# Patient Record
Sex: MALE | Race: WHITE | Employment: UNEMPLOYED | URBAN - METROPOLITAN AREA
[De-identification: names, ages, dates, MRNs, and addresses within clinical notes are randomized per-mention and may not be internally consistent; named-entity substitution may affect disease eponyms.]

---

## 2017-02-28 DIAGNOSIS — E11.9 CONTROLLED TYPE 2 DIABETES MELLITUS WITHOUT COMPLICATION, WITHOUT LONG-TERM CURRENT USE OF INSULIN (HCC): ICD-10-CM

## 2017-02-28 RX ORDER — GLIMEPIRIDE 2 MG/1
TABLET ORAL
Qty: 135 TAB | Refills: 0 | Status: SHIPPED | OUTPATIENT
Start: 2017-02-28 | End: 2017-05-31 | Stop reason: SDUPTHER

## 2017-03-22 ENCOUNTER — OFFICE VISIT (OUTPATIENT)
Dept: INTERNAL MEDICINE CLINIC | Age: 52
End: 2017-03-22

## 2017-03-22 VITALS
DIASTOLIC BLOOD PRESSURE: 80 MMHG | HEART RATE: 56 BPM | BODY MASS INDEX: 29.86 KG/M2 | HEIGHT: 68 IN | SYSTOLIC BLOOD PRESSURE: 125 MMHG | TEMPERATURE: 98.4 F | RESPIRATION RATE: 12 BRPM | WEIGHT: 197 LBS

## 2017-03-22 DIAGNOSIS — I25.10 ATHEROSCLEROSIS OF NATIVE CORONARY ARTERY OF NATIVE HEART, ANGINA PRESENCE UNSPECIFIED: ICD-10-CM

## 2017-03-22 DIAGNOSIS — E11.9 CONTROLLED TYPE 2 DIABETES MELLITUS WITHOUT COMPLICATION, WITHOUT LONG-TERM CURRENT USE OF INSULIN (HCC): Primary | ICD-10-CM

## 2017-03-22 DIAGNOSIS — I10 ESSENTIAL HYPERTENSION: ICD-10-CM

## 2017-03-22 DIAGNOSIS — E78.2 MIXED HYPERLIPIDEMIA: ICD-10-CM

## 2017-03-22 LAB — HBA1C MFR BLD HPLC: 6.3 %

## 2017-03-22 RX ORDER — SIMVASTATIN 40 MG/1
40 TABLET, FILM COATED ORAL
Qty: 90 TAB | Refills: 3 | Status: SHIPPED | OUTPATIENT
Start: 2017-03-22 | End: 2018-06-26 | Stop reason: SDUPTHER

## 2017-03-22 RX ORDER — CARVEDILOL 6.25 MG/1
TABLET ORAL
Qty: 90 TAB | Refills: 3 | Status: SHIPPED | OUTPATIENT
Start: 2017-03-22 | End: 2018-06-26 | Stop reason: SDUPTHER

## 2017-03-22 RX ORDER — METFORMIN HYDROCHLORIDE 500 MG/1
1000 TABLET, EXTENDED RELEASE ORAL 2 TIMES DAILY WITH MEALS
Qty: 180 TAB | Refills: 1 | Status: SHIPPED | OUTPATIENT
Start: 2017-03-22 | End: 2017-09-25 | Stop reason: SDUPTHER

## 2017-03-22 RX ORDER — LISINOPRIL 20 MG/1
20 TABLET ORAL
Qty: 90 TAB | Refills: 3 | Status: SHIPPED | OUTPATIENT
Start: 2017-03-22 | End: 2018-06-26 | Stop reason: SDUPTHER

## 2017-03-22 NOTE — MR AVS SNAPSHOT
Visit Information Date & Time Provider Department Dept. Phone Encounter #  
 3/22/2017  8:15 AM Roe Tapia MD [facility] [address]  [id number] Follow-up Instructions Return in about 3 months (around 6/22/2017) for a1c and labs. Your Appointments 6/13/2017  8:20 AM  
ESTABLISHED PATIENT with Nedra Gil MD  
[facility] (POWER SCHEDULING) Appt Note: 6 mo fu appt [address] [facility] 600 1007 [facility]  
[address] [facility] [address] [address] Upcoming Health Maintenance Date Due  
 EYE EXAM RETINAL OR DILATED Q1 12/14/1977 INFLUENZA AGE 9 TO ADULT 10/1/2017* HEMOGLOBIN A1C Q6M 6/9/2017 FOOT EXAM Q1 8/30/2017 MICROALBUMIN Q1 8/30/2017 LIPID PANEL Q1 12/9/2017 DTaP/Tdap/Td series (2 - Td) 7/27/2022 *Topic was postponed. The date shown is not the original due date. Allergies as of 3/22/2017  Review Complete On: 3/22/2017 By: Roe Tapia MD  
  
 Severity Noted Reaction Type Reactions Erythromycin  09/26/2010    Anaphylaxis Current Immunizations  Reviewed on 7/27/2012 Name Date Pneumococcal Vaccine (Unspecified Type) 7/27/2012 TDAP Vaccine 7/27/2012 Not reviewed this visit You Were Diagnosed With   
  
 Codes Comments Controlled type 2 diabetes mellitus without complication, without long-term current use of insulin ([facility] [address]    -  Primary ICD-10-CM: E11.9 ICD-9-CM: 250.00 Atherosclerosis of native coronary artery of native heart, angina presence unspecified     ICD-10-CM: I25.10 ICD-9-CM: 414.01 Essential hypertension     ICD-10-CM: I10 
ICD-9-CM: 401.9 Mixed hyperlipidemia     ICD-10-CM: E78.2 ICD-9-CM: 272.2 Vitals BP Pulse Temp Resp Height(growth percentile) Weight(growth percentile)  125/80 (BP 1 Location: Left arm, BP Patient Position: Sitting) (!) 56 98.4 °F (36.9 °C) (Oral) 12 5' 8\" (1.727 m) 197 lb (89.4 kg) BMI Smoking Status 29.95 kg/m2 Former Smoker Vitals History BMI and BSA Data Body Mass Index Body Surface Area  
 29.95 kg/m 2 2.07 m 2 Preferred Pharmacy Pharmacy Name Phone [facility] [address] [place] [address] [phone] Your Updated Medication List  
  
   
This list is accurate as of: 3/22/17  8:34 AM.  Always use your most recent med list.  
  
  
  
  
 aspirin delayed-release 81 mg tablet Take  by mouth daily. carvedilol 6.25 mg tablet Commonly known as:  COREG  
TAKE ONE-HALF TABLET BY MOUTH TWICE DAILY WITH MEALS  
  
 glimepiride 2 mg tablet Commonly known as:  AMARYL Take 1 pill in the AM and 1/2 pill at night  
  
 glucose blood VI test strips strip Commonly known as:  ASCENSIA AUTODISC VI, ONE TOUCH ULTRA TEST VI  
by Does Not Apply route. Check twice daily before breakfast and dinner  
  
 lisinopril 20 mg tablet Commonly known as:  Edmundo Economy Take 1 Tab by mouth nightly. metFORMIN  mg tablet Commonly known as:  GLUCOPHAGE XR Take 2 Tabs by mouth two (2) times daily (with meals). simvastatin 40 mg tablet Commonly known as:  ZOCOR Take 1 Tab by mouth nightly. Prescriptions Sent to Pharmacy Refills  
 metFORMIN ER (GLUCOPHAGE XR) 500 mg tablet 1 Sig: Take 2 Tabs by mouth two (2) times daily (with meals). Class: Normal  
 Pharmacy: [address] [place] [address] Ph #: [phone] Route: Oral  
 carvedilol (COREG) 6.25 mg tablet 3 Sig: TAKE ONE-HALF TABLET BY MOUTH TWICE DAILY WITH MEALS Class: Normal  
 Pharmacy: [address] Ph #: [phone]  
 lisinopril (PRINIVIL, ZESTRIL) 20 mg tablet 3 Sig: Take 1 Tab by mouth nightly.   
 Class: Normal  
 Pharmacy: [address] Ph #: [phone] Route: Oral  
 simvastatin (ZOCOR) 40 mg tablet 3 Sig: Take 1 Tab by mouth nightly. Class: Normal  
 Pharmacy: [address] Ph #: [phone] Route: Oral  
  
We Performed the Following HEMOGLOBIN A1C WITH EAG [24624 CPT(R)] LIPID PANEL [18174 CPT(R)] METABOLIC PANEL, COMPREHENSIVE [63549 CPT(R)] Follow-up Instructions Return in about 3 months (around 6/22/2017) for a1c and labs. Introducing [facility]! Dear Mary Quijano: Thank you for requesting a Talkable account. Our records indicate that you already have an active Talkable account. You can access your account anytime at [email or web address] Did you know that you can access your hospital and ER discharge instructions at any time in Talkable? You can also review all of your test results from your hospital stay or ER visit. Additional Information If you have questions, please visit the Frequently Asked Questions section of the Talkable website at [email or web address] Remember, Talkable is NOT to be used for urgent needs. For medical emergencies, dial 911. Now available from your iPhone and Android! Please provide this summary of care documentation to your next provider. Your primary care clinician is listed as Jennifer Rothman. If you have any questions after today's visit, please call [phone].

## 2017-03-22 NOTE — PROGRESS NOTES
HISTORY OF PRESENT ILLNESS    Chief Complaint   Patient presents with    Diabetes       Presents for follow-up    Diabetes Mellitus follow-up  Last hemoglobin a1c   Lab Results   Component Value Date/Time    Hemoglobin A1c 7.4 12/09/2016 09:00 AM    Hemoglobin A1c (POC) 10.2 08/30/2016 10:30 AM   medication compliance: compliant all of the time. Reports diarrhea after taking metformin  Diabetic diet compliance: compliant most of the time. Home glucose monitoring: fasting values range NOT DONE. Hypoglycemic episodes:  None. Further diabetic ROS: no polyuria or polydipsia, no chest pain, dyspnea or TIA's, no numbness, tingling or pain in extremities. Review of Systems   All other systems reviewed and are negative, except as noted in HPI    Past Medical and Surgical History   has a past medical history of CAD (coronary artery disease); DM (diabetes mellitus) ([facility] [address] (10/2011); Dyslipidemia; History of alcohol abuse; Hypertension; Metabolic syndrome; and Myocardial infarction ([facility] [address] has a past surgical history that includes heart catheterization (2-, 10/2007); coronary stent placement; and cholecystectomy (2002). reports that he quit smoking about 8 years ago. His smoking use included Cigarettes. He smoked 20.00 packs per day. He has never used smokeless tobacco. He reports that he does not drink alcohol or use illicit drugs. family history includes Cancer in his brother; Diabetes in his father, maternal grandmother, and paternal grandmother; Heart Disease in his paternal grandmother; Hypertension in his father, maternal grandmother, mother, and paternal grandmother. Physical Exam   Nursing note and vitals reviewed. Blood pressure 125/80, pulse (!) 56, temperature 98.4 °F (36.9 °C), temperature source Oral, resp. rate 12, height 5' 8\" (1.727 m), weight 197 lb (89.4 kg). Constitutional:  No distress.     Eyes: Conjunctivae are normal.   Ears:  Hearing grossly intact  Cardiovascular: Normal rate.  regular rhythm, no murmurs or gallops  No edema  Pulmonary/Chest: Effort normal.   CTAB  Musculoskeletal: moves all 4 extremities   Neurological: Alert and oriented to person, place, and time. Skin: No rash noted. Psychiatric: Normal mood and affect. Behavior is normal.     ASSESSMENT and PLAN  Fredis Maradiaga was seen today for diabetes. Diagnoses and all orders for this visit:    Controlled type 2 diabetes mellitus without complication, without long-term current use of insulin (HCC)  Much improved  Diarrhea w metformin  Change to ER version  -     metFORMIN ER (GLUCOPHAGE XR) 500 mg tablet; Take 2 Tabs by mouth two (2) times daily (with meals). -     METABOLIC PANEL, COMPREHENSIVE  -     LIPID PANEL  -     HEMOGLOBIN A1C WITH EAG    Atherosclerosis of native coronary artery of native heart, angina presence unspecified  -     carvedilol (COREG) 6.25 mg tablet; TAKE ONE-HALF TABLET BY MOUTH TWICE DAILY WITH MEALS    Essential hypertension  -     carvedilol (COREG) 6.25 mg tablet; TAKE ONE-HALF TABLET BY MOUTH TWICE DAILY WITH MEALS  -     lisinopril (PRINIVIL, ZESTRIL) 20 mg tablet; Take 1 Tab by mouth nightly. -     LIPID PANEL    Mixed hyperlipidemia  -     simvastatin (ZOCOR) 40 mg tablet; Take 1 Tab by mouth nightly. There are no Patient Instructions on file for this visit.    lab results and schedule of future lab studies reviewed with patient  reviewed medications and side effects in detail    Return to clinic for further evaluation if new symptoms develop    Follow-up Disposition:  Return in about 3 months (around 6/22/2017) for a1c and labs. Current Outpatient Prescriptions   Medication Sig    metFORMIN ER (GLUCOPHAGE XR) 500 mg tablet Take 2 Tabs by mouth two (2) times daily (with meals).  carvedilol (COREG) 6.25 mg tablet TAKE ONE-HALF TABLET BY MOUTH TWICE DAILY WITH MEALS    lisinopril (PRINIVIL, ZESTRIL) 20 mg tablet Take 1 Tab by mouth nightly.     simvastatin (ZOCOR) 40 mg tablet Take 1 Tab by mouth nightly.  glimepiride (AMARYL) 2 mg tablet Take 1 pill in the AM and 1/2 pill at night    glucose blood VI test strips (ASCENSIA AUTODISC VI, ONE TOUCH ULTRA TEST VI) strip by Does Not Apply route. Check twice daily before breakfast and dinner      aspirin delayed-release 81 mg tablet Take  by mouth daily. No current facility-administered medications for this visit.

## 2017-05-23 LAB
ALBUMIN SERPL-MCNC: 4.2 G/DL (ref 3.5–5.5)
ALBUMIN/GLOB SERPL: 1.8 {RATIO} (ref 1.2–2.2)
ALP SERPL-CCNC: 71 IU/L (ref 39–117)
ALT SERPL-CCNC: 36 IU/L (ref 0–44)
AST SERPL-CCNC: 22 IU/L (ref 0–40)
BILIRUB SERPL-MCNC: 0.5 MG/DL (ref 0–1.2)
BUN SERPL-MCNC: 9 MG/DL (ref 6–24)
BUN/CREAT SERPL: 10 (ref 9–20)
CALCIUM SERPL-MCNC: 9.2 MG/DL (ref 8.7–10.2)
CHLORIDE SERPL-SCNC: 102 MMOL/L (ref 96–106)
CHOLEST SERPL-MCNC: 131 MG/DL (ref 100–199)
CO2 SERPL-SCNC: 26 MMOL/L (ref 18–29)
CREAT SERPL-MCNC: 0.91 MG/DL (ref 0.76–1.27)
EST. AVERAGE GLUCOSE BLD GHB EST-MCNC: 154 MG/DL
GLOBULIN SER CALC-MCNC: 2.3 G/DL (ref 1.5–4.5)
GLUCOSE SERPL-MCNC: 137 MG/DL (ref 65–99)
HBA1C MFR BLD: 7 % (ref 4.8–5.6)
HDLC SERPL-MCNC: 36 MG/DL
INTERPRETATION, 910389: NORMAL
LDLC SERPL CALC-MCNC: 61 MG/DL (ref 0–99)
Lab: NORMAL
POTASSIUM SERPL-SCNC: 5 MMOL/L (ref 3.5–5.2)
PROT SERPL-MCNC: 6.5 G/DL (ref 6–8.5)
SODIUM SERPL-SCNC: 144 MMOL/L (ref 134–144)
TRIGL SERPL-MCNC: 171 MG/DL (ref 0–149)
VLDLC SERPL CALC-MCNC: 34 MG/DL (ref 5–40)

## 2017-05-31 DIAGNOSIS — E11.9 CONTROLLED TYPE 2 DIABETES MELLITUS WITHOUT COMPLICATION, WITHOUT LONG-TERM CURRENT USE OF INSULIN (HCC): ICD-10-CM

## 2017-05-31 RX ORDER — GLIMEPIRIDE 2 MG/1
TABLET ORAL
Qty: 135 TAB | Refills: 3 | Status: SHIPPED | OUTPATIENT
Start: 2017-05-31 | End: 2018-06-26 | Stop reason: SDUPTHER

## 2017-06-06 ENCOUNTER — TELEPHONE (OUTPATIENT)
Dept: INTERNAL MEDICINE CLINIC | Age: 52
End: 2017-06-06

## 2017-06-06 DIAGNOSIS — I10 ESSENTIAL HYPERTENSION, MALIGNANT: ICD-10-CM

## 2017-06-06 DIAGNOSIS — I21.3 ST ELEVATION MYOCARDIAL INFARCTION (STEMI), UNSPECIFIED ARTERY (HCC): ICD-10-CM

## 2017-06-06 DIAGNOSIS — I25.10 ATHEROSCLEROSIS OF NATIVE CORONARY ARTERY, ANGINA PRESENCE UNSPECIFIED, UNSPECIFIED WHETHER NATIVE OR TRANSPLANTED HEART: Primary | ICD-10-CM

## 2017-06-06 NOTE — TELEPHONE ENCOUNTER
Referral obtained and faxed to Dr Cerna Fess office at [phone]. Auth # [id number]  40 visits  6/6/17-6/6/18.

## 2017-06-06 NOTE — TELEPHONE ENCOUNTER
Referral:    Dr Willy Dozier  ([facility])    [address] [address]    Phone  [phone]    Fax [phone]    6 Months check up for his heart    June 13,2017    Mey Casey    [phone]

## 2017-06-13 ENCOUNTER — OFFICE VISIT (OUTPATIENT)
Dept: CARDIOLOGY CLINIC | Age: 52
End: 2017-06-13

## 2017-06-13 VITALS
HEIGHT: 66 IN | HEART RATE: 54 BPM | DIASTOLIC BLOOD PRESSURE: 84 MMHG | BODY MASS INDEX: 32.14 KG/M2 | SYSTOLIC BLOOD PRESSURE: 122 MMHG | WEIGHT: 200 LBS

## 2017-06-13 DIAGNOSIS — E78.2 MIXED HYPERLIPIDEMIA: ICD-10-CM

## 2017-06-13 DIAGNOSIS — I25.10 ATHEROSCLEROSIS OF NATIVE CORONARY ARTERY OF NATIVE HEART WITHOUT ANGINA PECTORIS: Primary | ICD-10-CM

## 2017-06-13 DIAGNOSIS — I10 ESSENTIAL HYPERTENSION: ICD-10-CM

## 2017-06-13 NOTE — PROGRESS NOTES
Janny Key MD. [facility]              Patient: Donna Lugo. : 1965      Today's Date: 2017            HISTORY OF PRESENT ILLNESS:     History of Present Illness:  Mr. Joss Porras is here for follow-up. No complaints. No CP or SOB. Does not exercise. Diet could be better. PAST MEDICAL HISTORY:     Past Medical History:   Diagnosis Date    CAD (coronary artery disease)     Dr. Smith Speaker DM (diabetes mellitus) ([facility] [address] 10/2011    a1c 13.2%    Dyslipidemia     low hdl    History of alcohol abuse     quit     Hypertension     Metabolic syndrome     Myocardial infarction ([facility] [address]     MI in 10/07 and          Past Surgical History:   Procedure Laterality Date    HX CHOLECYSTECTOMY      could not remove entire gallbladder    HX CORONARY STENT PLACEMENT      HX HEART CATHETERIZATION  2008, 10/2007           MEDICATIONS:     Current Outpatient Prescriptions   Medication Sig Dispense Refill    glimepiride (AMARYL) 2 mg tablet Take 1 pill in the AM and 1/2 pill at night 135 Tab 3    metFORMIN ER (GLUCOPHAGE XR) 500 mg tablet Take 2 Tabs by mouth two (2) times daily (with meals). 180 Tab 1    carvedilol (COREG) 6.25 mg tablet TAKE ONE-HALF TABLET BY MOUTH TWICE DAILY WITH MEALS 90 Tab 3    lisinopril (PRINIVIL, ZESTRIL) 20 mg tablet Take 1 Tab by mouth nightly. 90 Tab 3    simvastatin (ZOCOR) 40 mg tablet Take 1 Tab by mouth nightly. 90 Tab 3    aspirin delayed-release 81 mg tablet Take  by mouth daily.  glucose blood VI test strips (ASCENSIA AUTODISC VI, ONE TOUCH ULTRA TEST VI) strip by Does Not Apply route.  Check twice daily before breakfast and dinner   1 Package 11       Allergies   Allergen Reactions    Erythromycin Anaphylaxis             SOCIAL HISTORY:     Social History   Substance Use Topics    Smoking status: Former Smoker     Packs/day: 20.00     Types: Cigarettes     Quit date: 2008    Smokeless tobacco: Never Used    Alcohol use No FAMILY HISTORY:     Family History   Problem Relation Age of Onset    Diabetes Father     Hypertension Father     Diabetes Paternal Grandmother     Heart Disease Paternal Grandmother      pacemaker    Hypertension Paternal Grandmother     Cancer Brother      tesicular    Diabetes Maternal Grandmother     Hypertension Maternal Grandmother     Hypertension Mother               REVIEW OF SYMPTOMS:        Review of Symptoms:  Constitutional: Negative for fever, chills  HEENT: Negative for nosebleeds, tinnitus, and vision changes.    Respiratory: Negative for cough, wheezing  Cardiovascular: Negative for orthopnea, claudication, syncope, and PND.    Gastrointestinal: Negative for abdominal pain, diarrhea, melena.    Genitourinary: Negative for dysuria  Musculoskeletal: Negative for myalgias.    Skin: Negative for rash  Heme: No problems bleeding. Neurological: Negative for speech change and focal weakness.                   PHYSICAL EXAM:        Physical Exam:  Visit Vitals    /84    Pulse (!) 54    Ht 5' 6\" (1.676 m)    Wt 200 lb (90.7 kg)    BMI 32.28 kg/m2       Patient appears generally well, mood and affect are appropriate and pleasant. HEENT: Hearing intact, non-icteric, normocephalic, atraumatic.    Neck Exam: Supple, No JVD or carotid bruits.    Lung Exam: Clear to auscultation, even breath sounds.    Cardiac Exam: Regular rate and rhythm with no murmur  Abdomen: Soft, non-tender, normal bowel sounds. No bruits or masses. Extremities: Moves all ext well. No lower extremity edema. Vascular: 2+ dorsalis pedis pulses bilaterally.   Psych: Appropriate affect  Neuro - Grossly intact                  LABS / OTHER STUDIES:        Lab Results   Component Value Date/Time    Sodium 144 05/22/2017 08:23 AM    Potassium 5.0 05/22/2017 08:23 AM    Chloride 102 05/22/2017 08:23 AM    CO2 26 05/22/2017 08:23 AM    Glucose 137 05/22/2017 08:23 AM    BUN 9 05/22/2017 08:23 AM    Creatinine 0.91 05/22/2017 08:23 AM    BUN/Creatinine ratio 10 05/22/2017 08:23 AM    GFR est  05/22/2017 08:23 AM    GFR est non-AA 99 05/22/2017 08:23 AM    Calcium 9.2 05/22/2017 08:23 AM    Bilirubin, total 0.5 05/22/2017 08:23 AM    AST (SGOT) 22 05/22/2017 08:23 AM    Alk. phosphatase 71 05/22/2017 08:23 AM    Protein, total 6.5 05/22/2017 08:23 AM    Albumin 4.2 05/22/2017 08:23 AM    A-G Ratio 1.8 05/22/2017 08:23 AM    ALT (SGPT) 36 05/22/2017 08:23 AM     Lab Results   Component Value Date/Time    WBC 5.9 06/09/2016 09:23 AM    HGB 16.8 06/09/2016 09:23 AM    HCT 48.7 06/09/2016 09:23 AM    PLATELET 634 37/02/8619 09:23 AM    MCV 85 06/09/2016 09:23 AM     No results found for: LPSE  Lab Results   Component Value Date/Time    Hemoglobin A1c 7.0 05/22/2017 08:23 AM    Hemoglobin A1c (POC) 6.3 03/22/2017 08:15 AM     Lab Results   Component Value Date/Time    Cholesterol, total 131 05/22/2017 08:23 AM    HDL Cholesterol 36 05/22/2017 08:23 AM    LDL, calculated 61 05/22/2017 08:23 AM    VLDL, calculated 34 05/22/2017 08:23 AM    Triglyceride 171 05/22/2017 08:23 AM               CARDIAC DIAGNOSTICS:        Cardiac Evaluation Includes:   1. Cardiac catheterization February 27, 2008: Obstructive single vessel disease. The LAD had a 70% proximal obstruction at the first diagonal. The first diagonal had a 99% obstruction with heavy thrombus burden. The left circumflex had a mid 30% lesion. The RCA had a mid 50% lesion. The distal RCA had a 40% lesion. The patient had successful primary kissing balloon drug-eluting stent placement to the proximal LAD and D1 bifurcation. He had a 3.5 x 18 mm Cypher stent into the LAD and a 2.5 x 18 mg Cypher stent into the first diagonal.    2. Cath 10/07 - Had BMS to D1 after NSTEMI    3. Echo February 28, 2008: Normal left ventricular size and overall systolic function with an EF of 60%. The distal septum appears severely hypokinetic with focal apical akinesis.  No significant valvular abnormalities. Mildly dilated left atrium.    4. Symptom limited treadmill stress test 3/08: Average exercise tolerance. The patient achieved 75% predicted maximal heart rate. He walked eight minutes and 30 seconds on the treadmill, stopping for fatigue. There were no ischemic EKG changes. Overall normal results without any objective evidence of chest pain.        EKG 5/14 - NSR, non-specific T wave abn    EKG 6/9/16 - sinus bradycardia, non-specific T wave abn  EKG 6/13/17 - sinus bradycardia, non-specific T wave abn          ASSESSMENT AND PLAN:        Assessment and Plan:    1) CAD    - stable without chest pain complaints.    - continue current meds       2) Hypertension   - BP looks good on current meds  - cont regimen       3) Dyslipidemia   - Continue simvastatin at 40 mg   - recent lipids look good      4) Diabetes   - he is back on meds  - recent labs look OK       6) See me in 12 months. Patient expressed understanding of the plan - questions were answered.    On a side note, his mom had a stroke 2015. He enjoys hunting and fishing. Working at [employer], [address]  [address] [place] [address], [address]. [place], [address] [place] [address] [address] [facility]  Ph: [phone] Ph [phone]

## 2017-06-13 NOTE — PATIENT INSTRUCTIONS

## 2017-09-25 DIAGNOSIS — E11.9 CONTROLLED TYPE 2 DIABETES MELLITUS WITHOUT COMPLICATION, WITHOUT LONG-TERM CURRENT USE OF INSULIN (HCC): ICD-10-CM

## 2017-09-26 RX ORDER — METFORMIN HYDROCHLORIDE 500 MG/1
1000 TABLET, EXTENDED RELEASE ORAL 2 TIMES DAILY WITH MEALS
Qty: 180 TAB | Refills: 1 | Status: SHIPPED | OUTPATIENT
Start: 2017-09-26 | End: 2018-04-06 | Stop reason: SDUPTHER

## 2018-04-04 DIAGNOSIS — E11.9 CONTROLLED TYPE 2 DIABETES MELLITUS WITHOUT COMPLICATION, WITHOUT LONG-TERM CURRENT USE OF INSULIN (HCC): ICD-10-CM

## 2018-04-06 DIAGNOSIS — E11.9 CONTROLLED TYPE 2 DIABETES MELLITUS WITHOUT COMPLICATION, WITHOUT LONG-TERM CURRENT USE OF INSULIN (HCC): ICD-10-CM

## 2018-04-06 RX ORDER — METFORMIN HYDROCHLORIDE 500 MG/1
TABLET, EXTENDED RELEASE ORAL
Qty: 180 TAB | Refills: 1 | Status: SHIPPED | OUTPATIENT
Start: 2018-04-06

## 2018-04-09 RX ORDER — METFORMIN HYDROCHLORIDE 500 MG/1
1000 TABLET, EXTENDED RELEASE ORAL 2 TIMES DAILY WITH MEALS
Qty: 180 TAB | Refills: 1 | Status: SHIPPED | OUTPATIENT
Start: 2018-04-09

## 2018-06-26 DIAGNOSIS — E11.9 CONTROLLED TYPE 2 DIABETES MELLITUS WITHOUT COMPLICATION, WITHOUT LONG-TERM CURRENT USE OF INSULIN (HCC): ICD-10-CM

## 2018-06-26 DIAGNOSIS — I10 ESSENTIAL HYPERTENSION: ICD-10-CM

## 2018-06-26 DIAGNOSIS — I25.10 ATHEROSCLEROSIS OF NATIVE CORONARY ARTERY OF NATIVE HEART, ANGINA PRESENCE UNSPECIFIED: ICD-10-CM

## 2018-06-26 DIAGNOSIS — E78.2 MIXED HYPERLIPIDEMIA: ICD-10-CM

## 2018-06-26 RX ORDER — CARVEDILOL 6.25 MG/1
TABLET ORAL
Qty: 90 TAB | Refills: 0 | Status: SHIPPED | OUTPATIENT
Start: 2018-06-26

## 2018-06-26 RX ORDER — SIMVASTATIN 40 MG/1
40 TABLET, FILM COATED ORAL
Qty: 90 TAB | Refills: 0 | Status: SHIPPED | OUTPATIENT
Start: 2018-06-26

## 2018-06-26 RX ORDER — LISINOPRIL 20 MG/1
20 TABLET ORAL
Qty: 90 TAB | Refills: 0 | Status: SHIPPED | OUTPATIENT
Start: 2018-06-26

## 2018-06-26 RX ORDER — GLIMEPIRIDE 2 MG/1
TABLET ORAL
Qty: 135 TAB | Refills: 0 | Status: SHIPPED | OUTPATIENT
Start: 2018-06-26

## 2018-06-26 NOTE — TELEPHONE ENCOUNTER
Patient needs 4 refills glimepiride (AMARYL) 2 mg tablet,carvedilol (COREG) 6.25 mg tablet, lisinopril (PRINIVIL, ZESTRIL) 20 mg tablet and simvastatin (ZOCOR) 40 mg tablet   They need to be called into the [facility] at [phone]  His no [phone]